# Patient Record
(demographics unavailable — no encounter records)

---

## 2025-01-27 NOTE — ASSESSMENT
[FreeTextEntry1] : Echocardiogram July 23, 2019 demonstrated left ventricle normal in size and function with ejection fraction of 55-60%. Mild concentric LVH noted. Mild mitral, tricuspid and pulmonic regurgitation noted. No significant change from prior.  Nuclear stress test July 1, 2019 was a pharmacologic study which was tolerated well. Blood pressure response was normal. EKG showed no evidence of ischemia. Nuclear imaging showed no evidence of ischemia or infarct and ejection fraction of 64%.  Echocardiogram August 20, 2021 demonstrated left ventricle normal in size and function with ejection fraction of 55 to 60%. Moderate asymmetric LVH with more septal thickening and grade 1 left ventricular diastolic dysfunction. Mildly large right ventricle with normal RV function. Moderate biatrial dilatation. Mild mitral and pulmonic insufficiency.  Nuclear stress test March 29, 2023 was a pharmacologic study which was tolerated well. Blood pressure was elevated at baseline at 148/86 with a normal response to infusion. EKG showed no evidence of ischemia with infusion. Evaluation of nuclear imaging showed no evidence of ischemia or infarct and ejection fraction of 64%.  24-hour blood pressure monitor March 6, 2024 showed a 24-hour average of 123/65.  Average while awake 127/67, average while asleep 110/58.  Echocardiogram May 1, 2024 demonstrated left ventricle normal size and function with a fraction of 60 to 65%.  Grade 2 left ventricular valve dysfunction with elevated filling pressures.  Mild to moderate concentric LVH.  Borderline enlarged right ventricle with normal function.  Moderately dilated left atrium, mildly dilated right atrium.  Mild to moderate mitral and trace tricuspid regurgitation.  Borderline pulmonary hypertension with peak pressure of 39 mmHg.  EKG: Sinus rhythm, no significant ST or T wave changes. Delayed R wave progression.  86-year-old female with a past medical history of CAD, chronic diastolic congestive heart failure, hypertension, TIA, PE,  who presents to me for followup evaluation.  Patient presents back today unfortunately having ultimately been diagnosed with .  She is still on steroids and will be for the next 2 weeks but then hopefully will be able to stop them.  Thankfully she is not having any real respiratory symptoms.  No evidence of heart failure at this time.  Her blood pressures have been significantly higher since being on steroids.  I will increase her nighttime carvedilol but hopefully after the steroids are stopped her blood pressures will come back down.  No evidence of any cardiac ischemia at this time.

## 2025-01-27 NOTE — DISCUSSION/SUMMARY
[FreeTextEntry1] : 1.  No additional cardiac testing at this time. 2.  Increase nighttime carvedilol dose to 6.25 mg daily. 3.  Continue other current cardiac meds in doses as noted above for CAD, CHF, hypertension including lovastatin 40 mg daily. 4. Continue Eliquis for pulmonary embolism.  Continue off aspirin and Plavix.  Patient is encouraged to be more careful and be sure not to have any falls. 5. Monitor BP at home, keep a log and bring to f/u.  6. Follow-up with pulmonary.  Complete steroids as per their recommendations. 7. Encourage her to be as active as possible and continue with her exercise. 8. Patient encouraged to work on diet to lose weight. 9. Encourage patient to be sure that she hydrates well. 10. Follow up here in 3 months. [EKG obtained to assist in diagnosis and management of assessed problem(s)] : EKG obtained to assist in diagnosis and management of assessed problem(s)

## 2025-01-27 NOTE — HISTORY OF PRESENT ILLNESS
[FreeTextEntry1] : Patient presents back to the office today unfortunately having been in and out of the hospital a few times over the last few months.  She initially presented to [facility] on November 5 with complaints of back pain and chills and fever.  She was found to have a UTI and was given antibiotics and sent home.  She was called later that day that her chest x-ray showed lung nodules.  She then showed up at [facility].  She was evaluated there and ultimately underwent biopsy which she eventually got back the results of showing .  She has now been on steroids for the last 2 months and will complete that in the next 2 weeks.  She also went to the ER a few days ago with complaints of epigastric discomfort that was relieved by Maalox and belching.  She has not had any other chest discomforts.  She had been having some coughing but this has been better since she has been on steroids.  She does not have any real shortness of breath.  Patient denies palpitations, orthopnea, presyncope, syncope.

## 2025-01-27 NOTE — PHYSICAL EXAM
[General Appearance - In No Acute Distress] : no acute distress [Normal Conjunctiva] : the conjunctiva exhibited no abnormalities [Normal Oral Mucosa] : normal oral mucosa [Normal Oropharynx] : normal oropharynx [Auscultation Breath Sounds / Voice Sounds] : lungs were clear to auscultation bilaterally [Normal Rate] : normal [Rhythm Regular] : regular [Normal S1] : normal S1 [Normal S2] : normal S2 [S4] : an S4 was heard [II] : a grade 2 [Abdomen Soft] : soft [Abdomen Tenderness] : non-tender [Nail Clubbing] : no clubbing of the fingernails [Cyanosis, Localized] : no localized cyanosis [Skin Color & Pigmentation] : normal skin color and pigmentation [Oriented To Time, Place, And Person] : oriented to person, place, and time [Affect] : the affect was normal [S3] : no S3 [FreeTextEntry1] : No edema

## 2025-04-23 NOTE — HISTORY OF PRESENT ILLNESS
[FreeTextEntry1] : Patient presents back to the office today having followed up with hematology because of her ongoing issues with anemia.  While at the hematologist she was told that she had more borderline pancytopenia and possibly myelodysplastic syndrome.  She was also found to have severe iron and B12 deficiency.  She was put on iron and B12 and the numbers got a little better.  There was initially plan for possible bone marrow biopsy but this is not planned at this time.  Patient reports feeling tired but no other real specific physical symptoms.  Blood pressure at home are mostly in the 120s to 130s systolic.  Patient denies chest pain, shortness of breath, palpitations, orthopnea, presyncope, syncope.

## 2025-04-23 NOTE — ASSESSMENT
[FreeTextEntry1] : Echocardiogram July 23, 2019 demonstrated left ventricle normal in size and function with ejection fraction of 55-60%. Mild concentric LVH noted. Mild mitral, tricuspid and pulmonic regurgitation noted. No significant change from prior.  Nuclear stress test July 1, 2019 was a pharmacologic study which was tolerated well. Blood pressure response was normal. EKG showed no evidence of ischemia. Nuclear imaging showed no evidence of ischemia or infarct and ejection fraction of 64%.  Echocardiogram August 20, 2021 demonstrated left ventricle normal in size and function with ejection fraction of 55 to 60%. Moderate asymmetric LVH with more septal thickening and grade 1 left ventricular diastolic dysfunction. Mildly large right ventricle with normal RV function. Moderate biatrial dilatation. Mild mitral and pulmonic insufficiency.  Nuclear stress test March 29, 2023 was a pharmacologic study which was tolerated well. Blood pressure was elevated at baseline at 148/86 with a normal response to infusion. EKG showed no evidence of ischemia with infusion. Evaluation of nuclear imaging showed no evidence of ischemia or infarct and ejection fraction of 64%.  24-hour blood pressure monitor March 6, 2024 showed a 24-hour average of 123/65.  Average while awake 127/67, average while asleep 110/58.  Echocardiogram May 1, 2024 demonstrated left ventricle normal size and function with a fraction of 60 to 65%.  Grade 2 left ventricular valve dysfunction with elevated filling pressures.  Mild to moderate concentric LVH.  Borderline enlarged right ventricle with normal function.  Moderately dilated left atrium, mildly dilated right atrium.  Mild to moderate mitral and trace tricuspid regurgitation.  Borderline pulmonary hypertension with peak pressure of 39 mmHg.  EKG: Sinus rhythm, no significant ST or T wave changes. Delayed R wave progression.  86-year-old female with a past medical history of CAD, chronic diastolic congestive heart failure, hypertension, TIA, PE,  who presents to me for followup evaluation.   Patient presents back today appearing better from a cardiac standpoint.  Her breathing has certainly improved.  She is still tired a lot.  She also has significant anemia and apparently pancytopenia.  The diagnosis of myelodysplasia has been considered although it is not clear that it has been diagnosed as such.  She does have B12 and iron deficiency for which she is now taking supplementation.  She needs to follow-up with hematology.  Patient has been having issues with shortness of breath on exertion which likely is related to her lung disease and possibly anemia but we should rule out a cardiac cause.  No evidence of heart failure at this time.  Blood pressure appears to be adequately controlled on the current regimen.  EKG appears unchanged.

## 2025-04-23 NOTE — DISCUSSION/SUMMARY
[FreeTextEntry1] : 1.  Echocardiogram and nuclear stress test given her ongoing issues of shortness of breath on exertion and prior CAD. 2.  Continue current cardiac meds in doses as noted above for CAD, CHF, hypertension including lovastatin 40 mg daily. 3. Continue Eliquis for pulmonary embolism.  Continue off aspirin and Plavix.  Patient is encouraged to be more careful and be sure not to have any falls. 4. Monitor BP at home, keep a log and bring to f/u.  5. Follow-up with pulmonary.   6.  Follow-up with hematology.  Consider bone marrow biopsy if myelodysplasia is being considered as a diagnosis and possible treatment for that. 7. Encourage her to be as active as possible and continue with her exercise. 8. Patient encouraged to work on diet to lose weight. 9. Encourage patient to be sure that she hydrates well. 10. Follow up here in 3 months.